# Patient Record
Sex: FEMALE | Race: ASIAN | Employment: UNEMPLOYED | URBAN - METROPOLITAN AREA
[De-identification: names, ages, dates, MRNs, and addresses within clinical notes are randomized per-mention and may not be internally consistent; named-entity substitution may affect disease eponyms.]

---

## 2017-05-09 ENCOUNTER — TRANSFERRED RECORDS (OUTPATIENT)
Dept: HEALTH INFORMATION MANAGEMENT | Facility: CLINIC | Age: 52
End: 2017-05-09

## 2017-10-15 ENCOUNTER — HEALTH MAINTENANCE LETTER (OUTPATIENT)
Age: 52
End: 2017-10-15

## 2018-11-15 ENCOUNTER — TRANSFERRED RECORDS (OUTPATIENT)
Dept: HEALTH INFORMATION MANAGEMENT | Facility: CLINIC | Age: 53
End: 2018-11-15

## 2019-02-14 ENCOUNTER — DOCUMENTATION ONLY (OUTPATIENT)
Dept: CARE COORDINATION | Facility: CLINIC | Age: 54
End: 2019-02-14

## 2019-02-26 DIAGNOSIS — R06.02 SOB (SHORTNESS OF BREATH): Primary | ICD-10-CM

## 2019-02-26 NOTE — TELEPHONE ENCOUNTER
RECORDS RECEIVED FROM: External   DATE RECEIVED: 3.7.19   NOTES STATUS DETAILS   OFFICE NOTE from referring provider Care Everywhere 12.26.18   11.15.18  8.22.18  5.24.18   OFFICE NOTE from other specialist Care Everywhere 3.9.18  2.26.18  8.14.17  5.22.17   DISCHARGE SUMMARY from hospital N/A    DISCHARGE REPORT from the ER N/A    OPERATIVE REPORT N/A    MEDICATION LIST Internal    IMAGING  (NEED IMAGES AND REPORTS)     CT SCAN In process 8.24.18   CHEST XRAY (CXR) In process 5.24.18 2.26.18  9.28.16  1.24.16   TESTS     PULMONARY FUNCTION TESTING (PFT) In process Requested results from 11.15.18   FLOW LOOP VOLUME (FVL) In process See above   CYSTIC FIBROSIS     CF SPUTUM CULTURE N/A       SOB, referred by Dr Vaibhav Fernandez. PFT done at   Action    Action Taken Requested PFT results and for images to be pushed into PACS from [facility]. Requested on 2.26.19.   Sent message to RN pool to place pft and cxr orders.  3.5.19 Sent 2nd request for images and pft results.  3.6.19 Spoke with Alyson at  and she will be pushing images. ALEJANDRA with [facility] recs regarding PFT request.  3.6.19 Pulled images from .  3.7.19 PFT results from  in provider's folder.

## 2019-03-07 ENCOUNTER — OFFICE VISIT (OUTPATIENT)
Dept: PULMONOLOGY | Facility: CLINIC | Age: 54
End: 2019-03-07
Payer: COMMERCIAL

## 2019-03-07 ENCOUNTER — ANCILLARY PROCEDURE (OUTPATIENT)
Dept: GENERAL RADIOLOGY | Facility: CLINIC | Age: 54
End: 2019-03-07
Payer: COMMERCIAL

## 2019-03-07 ENCOUNTER — PRE VISIT (OUTPATIENT)
Dept: PULMONOLOGY | Facility: CLINIC | Age: 54
End: 2019-03-07

## 2019-03-07 VITALS
HEIGHT: 58 IN | BODY MASS INDEX: 31.49 KG/M2 | WEIGHT: 150 LBS | OXYGEN SATURATION: 98 % | HEART RATE: 52 BPM | DIASTOLIC BLOOD PRESSURE: 79 MMHG | SYSTOLIC BLOOD PRESSURE: 152 MMHG | RESPIRATION RATE: 16 BRPM

## 2019-03-07 DIAGNOSIS — R06.02 SOB (SHORTNESS OF BREATH): ICD-10-CM

## 2019-03-07 DIAGNOSIS — R06.02 SHORTNESS OF BREATH: Primary | ICD-10-CM

## 2019-03-07 DIAGNOSIS — J47.9 BRONCHIECTASIS WITHOUT COMPLICATION (H): ICD-10-CM

## 2019-03-07 DIAGNOSIS — I27.20 PULMONARY HYPERTENSION (H): ICD-10-CM

## 2019-03-07 DIAGNOSIS — I25.10 CORONARY ARTERY DISEASE INVOLVING NATIVE HEART, ANGINA PRESENCE UNSPECIFIED, UNSPECIFIED VESSEL OR LESION TYPE: ICD-10-CM

## 2019-03-07 DIAGNOSIS — I42.1 HOCM (HYPERTROPHIC OBSTRUCTIVE CARDIOMYOPATHY) (H): ICD-10-CM

## 2019-03-07 DIAGNOSIS — J39.8 TRACHEOBRONCHOMALACIA: ICD-10-CM

## 2019-03-07 LAB
ALBUMIN SERPL-MCNC: 3.6 G/DL (ref 3.4–5)
ALP SERPL-CCNC: 87 U/L (ref 40–150)
ALT SERPL W P-5'-P-CCNC: 29 U/L (ref 0–50)
ANION GAP SERPL CALCULATED.3IONS-SCNC: 5 MMOL/L (ref 3–14)
AST SERPL W P-5'-P-CCNC: 25 U/L (ref 0–45)
BASOPHILS # BLD AUTO: 0.1 10E9/L (ref 0–0.2)
BASOPHILS NFR BLD AUTO: 0.6 %
BILIRUB SERPL-MCNC: 0.5 MG/DL (ref 0.2–1.3)
BUN SERPL-MCNC: 16 MG/DL (ref 7–30)
CALCIUM SERPL-MCNC: 8.7 MG/DL (ref 8.5–10.1)
CHLORIDE SERPL-SCNC: 103 MMOL/L (ref 94–109)
CO2 SERPL-SCNC: 31 MMOL/L (ref 20–32)
CREAT SERPL-MCNC: 0.62 MG/DL (ref 0.52–1.04)
CRP SERPL-MCNC: 3.7 MG/L (ref 0–8)
DIFFERENTIAL METHOD BLD: NORMAL
EOSINOPHIL # BLD AUTO: 0.3 10E9/L (ref 0–0.7)
EOSINOPHIL NFR BLD AUTO: 4.3 %
ERYTHROCYTE [DISTWIDTH] IN BLOOD BY AUTOMATED COUNT: 14.1 % (ref 10–15)
ERYTHROCYTE [SEDIMENTATION RATE] IN BLOOD BY WESTERGREN METHOD: 16 MM/H (ref 0–30)
GFR SERPL CREATININE-BSD FRML MDRD: >90 ML/MIN/{1.73_M2}
GLUCOSE SERPL-MCNC: 111 MG/DL (ref 70–99)
HCT VFR BLD AUTO: 42.5 % (ref 35–47)
HGB BLD-MCNC: 13.7 G/DL (ref 11.7–15.7)
IMM GRANULOCYTES # BLD: 0 10E9/L (ref 0–0.4)
IMM GRANULOCYTES NFR BLD: 0.3 %
LYMPHOCYTES # BLD AUTO: 2.8 10E9/L (ref 0.8–5.3)
LYMPHOCYTES NFR BLD AUTO: 35.3 %
MCH RBC QN AUTO: 28.9 PG (ref 26.5–33)
MCHC RBC AUTO-ENTMCNC: 32.2 G/DL (ref 31.5–36.5)
MCV RBC AUTO: 90 FL (ref 78–100)
MONOCYTES # BLD AUTO: 0.6 10E9/L (ref 0–1.3)
MONOCYTES NFR BLD AUTO: 7.4 %
NEUTROPHILS # BLD AUTO: 4.1 10E9/L (ref 1.6–8.3)
NEUTROPHILS NFR BLD AUTO: 52.1 %
NRBC # BLD AUTO: 0 10*3/UL
NRBC BLD AUTO-RTO: 0 /100
PLATELET # BLD AUTO: 202 10E9/L (ref 150–450)
POTASSIUM SERPL-SCNC: 3.9 MMOL/L (ref 3.4–5.3)
PROT SERPL-MCNC: 7.5 G/DL (ref 6.8–8.8)
RBC # BLD AUTO: 4.74 10E12/L (ref 3.8–5.2)
SODIUM SERPL-SCNC: 138 MMOL/L (ref 133–144)
WBC # BLD AUTO: 7.9 10E9/L (ref 4–11)

## 2019-03-07 PROCEDURE — 86431 RHEUMATOID FACTOR QUANT: CPT

## 2019-03-07 PROCEDURE — 82784 ASSAY IGA/IGD/IGG/IGM EACH: CPT

## 2019-03-07 PROCEDURE — 86038 ANTINUCLEAR ANTIBODIES: CPT

## 2019-03-07 PROCEDURE — 36415 COLL VENOUS BLD VENIPUNCTURE: CPT

## 2019-03-07 PROCEDURE — 86255 FLUORESCENT ANTIBODY SCREEN: CPT

## 2019-03-07 PROCEDURE — 82787 IGG 1 2 3 OR 4 EACH: CPT

## 2019-03-07 PROCEDURE — 85025 COMPLETE CBC W/AUTO DIFF WBC: CPT

## 2019-03-07 PROCEDURE — 86235 NUCLEAR ANTIGEN ANTIBODY: CPT

## 2019-03-07 PROCEDURE — 86140 C-REACTIVE PROTEIN: CPT

## 2019-03-07 PROCEDURE — 80053 COMPREHEN METABOLIC PANEL: CPT

## 2019-03-07 PROCEDURE — 86200 CCP ANTIBODY: CPT

## 2019-03-07 PROCEDURE — 85652 RBC SED RATE AUTOMATED: CPT

## 2019-03-07 PROCEDURE — G0463 HOSPITAL OUTPT CLINIC VISIT: HCPCS | Mod: ZF

## 2019-03-07 RX ORDER — IPRATROPIUM BROMIDE AND ALBUTEROL SULFATE 2.5; .5 MG/3ML; MG/3ML
1 SOLUTION RESPIRATORY (INHALATION) EVERY 6 HOURS PRN
Qty: 360 ML | Refills: 3 | Status: SHIPPED | OUTPATIENT
Start: 2019-03-07 | End: 2019-07-01

## 2019-03-07 ASSESSMENT — PAIN SCALES - GENERAL: PAINLEVEL: NO PAIN (0)

## 2019-03-07 ASSESSMENT — MIFFLIN-ST. JEOR: SCORE: 1160.02

## 2019-03-07 NOTE — LETTER
3/7/2019       RE: Summer Freeman  [id number]  [facility] [place] [id number]     Dear Colleague,    Thank you for referring your patient, Summer Freeman, to the [facility] LUNG SCIENCE AND HEALTH at [facility]. Please see a copy of my visit note below.    [facility]  Pulmonary Medicine  Visit Clinic Note  March 7, 2019         ASSESSMENT & PLAN       Shortness of Breath: This is a bit difficult to sort out since she has both cardiac and pulmonary disease.      From a cardiac standpoint, she has hypertrophic cardiomyopathy.  She has signficantly elevated LVEDP on her last heart cath. Her pulmonary artery on chest CT appears dilated, and I suspect this is related to chronic left heart disease.  She has significant CAD and has received stents in the past and has wall motion abnormalities on exercise ECHO. She is currently hypertensive in the clinic.     From a pulmonary perspective, she has bronchiectasis, what appears to be significant tracheobronchomalacia with excessive airway dynamic collapse, and mosaic attenuation defects of the parenchyma which support airways disease. Her PFTs are non-specific, but could support airways disease with a low FVC, and normal TLC and RV. I believe her main pulmonary issue is the dynamic collapse of her airways.  This could her imaging findings of mosaic attenuation defects.  It could potentially lead to bronchiectasis if she has a tough time clearing secretions in her lower lobes.  An alternative diagnosis would be obliterative bronchiolitis, which comes with a more ominous prognosis and is much more rare.      It is tough to know how much of her dyspnea is related to the heart vs the lungs.  I discussed the utility of a CPET.  More than likely, it will not be able to distinguish between the two, but there are not other good options.  If her breathing reserve is exhausted that may suggest respiratory.     I recommended  "that she attend pulmonary rehab, get used to using pursed lip breathing, lose weight and get her body in better condition.  She should continue to use CPAP.  I have prescribe her a nebulizer with duo-nebs.  She has not had much benefit with breo.  Perhaps an nebulized bronchodilator will be able to reach her distal airways more effectively.  I have also asked her to get blood work looking for causes of her bronchiectasis (MARIA ALEJANDRA, RF, CCP, ANCA, Ig levels, Scl-70).      I will call her back after I receive her testing results. Unfortunately, obliterative bronchiolitis does not have good treatment options.  An open lung biopsy is required to diagnose this, and I do not think that is necessary at this time.  She does need to have her spirometry measured at least every 6 months to look for changes.  obliterative bronchiolitis is typically described as having a low FEV1/FVC, but it doesn't always present that way. Lung transplant is the only \"fix\" for this diagnosis. Tracheobronchomalacia also doesn't have good treatment options, but at least has a better prognosis.  Weight loss will help, as does positive airway pressure.  We could work on getting her a NIOV device.  I would like to see that she participates in pulmonary rehab.        Randy Watt MD          Today's visit note:     Chief Complaint: Summer Freeman is a 54 year old year old female who is being seen for Consult (Shortness of Breathe )      HISTORY OF PRESENT ILLNESS:    This is a 54-year-old female with a history of hypertrophic cardiomyopathy, coronary artery disease, bronchiectasis who is presenting to the pulmonary clinic today as a second opinion for shortness of breath.    She states she was last felt normal from a shortness of breath perspective when she is around 38 years old.  She has had gradually progressive shortness of breath since then.  A couple years ago she began to get more medical workup for this.  She was diagnosed with a myocardial " "infarction, had stents placed, had heart catheterizations showing an elevated left ventricular end-diastolic pressure, pulmonary hypertension, and left ventricular outflow tract obstruction consistent with a hypertrophic cardiomyopathy.  It was initially felt that all of her shortness of breath was related to the heart due to these reasons.  However, pulmonary function testing also showed mild restriction and she had bronchiectasis on her chest CT scan.  Therefore pulmonary diagnoses were also considered as a cause for her shortness of breath.    She has been put on a trial of Breo, which is an inhaled corticosteroid and long-acting beta agonist.  She has not noted any benefit in her breathing.  She is put on systemic prednisone for a couple weeks, and also did not notice any improvement in her breathing.  She was seen by a sleep doctor, who diagnosed her with obstructive sleep apnea.  She is wearing her CPAP machine, and actually notices that she gets much better sleep since she has been using this.    Overall, her dyspnea is pretty severe.  She think she could probably want to walk 1 block, but it would be hard to do.  She is short of breath walking around her house doing normal activities.  She describes chest tightness, but no cough and no wheeze.  She does not produce any sputum.  She does notice a little bit of mucus in the back of her throat when she wakes up in the morning.    There are no new joint pains or skin rashes.  She does have chronic neck and back pain.           Past Medical and Surgical History:     Past Medical History:   Diagnosis Date     Chronic pain      Coronary artery disease      Gastro-oesophageal reflux disease      Heart attack (H)      History of angina     \"has chest pain all the time\"--takes medications for it and nitroglycerin aprox 1-2 times weekly     Sleep apnea      Stented coronary artery     Cardiac Stents X 3 last in 2009     Past Surgical History:   Procedure Laterality " Date     ANGIOPLASTY       FUSION CERVICAL ANTERIOR ONE LEVEL       OPTICAL TRACKING SYSTEM FUSION POSTERIOR SPINE LUMBAR  8/8/2013    Procedure: OPTICAL TRACKING SYSTEM FUSION SPINE POSTERIOR LUMBAR ONE LEVEL;  Lumbar 4-5 Transforaminal Lumbar Interbody Fusion   ;  Surgeon: Harrison Wolfe MD;  Location: [facility] OR           Family History:     She is not aware of any medical problems in the family.            Social History:     Social History     Socioeconomic History     Marital status:      Spouse name: Not on file     Number of children: Not on file     Years of education: Not on file     Highest education level: Not on file   Occupational History     Not on file   Social Needs     Financial resource strain: Not on file     Food insecurity:     Worry: Not on file     Inability: Not on file     Transportation needs:     Medical: Not on file     Non-medical: Not on file   Tobacco Use     Smoking status: Never Smoker     Smokeless tobacco: Never Used   Substance and Sexual Activity     Alcohol use: No     Drug use: No     Sexual activity: Not on file   Lifestyle     Physical activity:     Days per week: Not on file     Minutes per session: Not on file     Stress: Not on file   Relationships     Social connections:     Talks on phone: Not on file     Gets together: Not on file     Attends Hindu service: Not on file     Active member of club or organization: Not on file     Attends meetings of clubs or organizations: Not on file     Relationship status: Not on file     Intimate partner violence:     Fear of current or ex partner: Not on file     Emotionally abused: Not on file     Physically abused: Not on file     Forced sexual activity: Not on file   Other Topics Concern     Not on file   Social History Narrative    . Worked for [employer].     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              Medications:     Current Outpatient Medications  "  Medication     aspirin 325 MG tablet     baclofen (LIORESAL) 10 MG tablet     clonazePAM (KLONOPIN) 0.5 MG tablet     clopidogrel (PLAVIX) 75 MG tablet     divalproex (DEPAKOTE) 250 MG EC tablet     famotidine (PEPCID) 20 MG tablet     Furosemide (LASIX) 20 MG tablet     imipramine (TOFRANIL) 25 MG tablet     metoprolol (TOPROL-XL) 100 MG 24 hr tablet     nitroglycerin (NITROSTAT) 0.4 MG SL tablet     pantoprazole (PROTONIX) 40 MG enteric coated tablet     ranolazine (RANEXA) 500 MG 12 hr tablet     rosuvastatin (CRESTOR) 20 MG tablet     venlafaxine (EFFEXOR XR) 75 MG 24 hr capsule     zolpidem (AMBIEN) 10 MG tablet     No current facility-administered medications for this visit.             Review of Systems:       A complete review of systems was otherwise negative except as noted in the HPI.      PHYSICAL EXAM:  /79 (BP Location: Right arm, Patient Position: Chair, Cuff Size: Adult Regular)   Pulse 52   Resp 16   Ht 1.473 m (4' 9.99\")   Wt 68 kg (150 lb)   SpO2 98%   BMI 31.36 kg/m        General: Pleasant, no apparent distress  Eyes: Anicteric  Nose: Nasal mucosa with no edema or hyperemia.  No polyps  Ears: Hearing grossly normal  Mouth: Mallampati 4  Neck: supple, no thyromegaly  Lymphatics: No cervical or supraclavicular nodes  Respiratory: Good air movement.  Dry crackles in the right lung base. No rhonchi. No wheezes  Cardiac: Bradycardic, normal S1, S2. No murmurs. No JVD  Abdomen: Soft, NT/ND  Musculoskeletal: Extremities normal. No clubbing. No cyanosis. No edema.  Skin: No rash on limited exam  Neuro: Normal mentation. Normal speech.  Psych:Normal affect           Data:   All laboratory and imaging data reviewed.      PFT:   May 9, 2017  FVC is 2.06 L which is 75% predicted.  FEV1 is 1.58 L which is 73% predicted.  FEV1/FVC ratio is 0.77.  These are all prebronchodilator values.  Lung volumes show a total lung capacity of 94% predicted, and a residual volume of 105% predicted.  Diffusion " capacity is 69% predicted.  It corrects to normal when normalized alveolar volume.    November 15, 2018  FVC is 1.93 L which is 71% predicted and improves to 2.1 L which is 77% predicted after administration of albuterol.  FEV1 is 1.46 L which is 69% predicted and improved to 1.6 L which is 75% predicted after administration of albuterol.  Postbronchodilator FEV1/FVC ratio 0.76.  Total lung capacity is 89% predicted and the residual volume is 105% predicted.  The RV\TLC ratio was 0.41.  Diffusion capacity is 83% predicted    PFT Interpretation:  No airflow obstruction by FEV1/FVC ratio. Low spirometry and elevated RV suggest some airways disease though.  Normal total lung capacity.    Valid Maneuver    CXR: I have reviewed the CXR images.  Right hear looks enlarged.  Normal appearing lung fields.  Awaiting radiology report.      Chest CT: I have reviewed the chest CT from 2013 as well as August 2018.  The 2013 chest CT scan of the PE protocol CT.  On my review, it showed areas of bronchitis, and bronchiectasis.  The bronchiectasis was especially notable in the left lower lobe.  There is some consolidative changes in the right lower lobe with associated small pleural effusion.  There is diffuse excessive dynamic airway collapse in the trachea and the major bronchi.  The right bronchi had areas of almost near occlusion.    On my review, the August 2018 chest CT scan shows worsening bilateral lower lobe bronchiectasis.  There is no significant mucus in the airways.  On expiratory imaging, there is excessive airway dynamic collapse.  There is also mosaic attenuation defects that is likely related to gas trapping.  Her heart appears enlarged, with a small pericardial effusion.  Stents are noted.  Left atrium is enlarged.  Pulmonary artery is enlarged.    Exercise ECHO 7/2018:     CONCLUSION:    Patient exercised 6 minutes 57 seconds on a Darian protocol.     Normal functional aerobic capacity.     Patient developed 8/10  chest tightness and dizziness at peak     exercise, resolved in recovery.    Borderline ECG test for diagnostic ST depression.     Stress-induced hypokinesis of the apex and antwon-apical segments     is noted. This would suggest LAD disease.    LVH is noted.    [facility] 7/2018:  7/12/2018 coronary angiography performed demonstrated     1.  Nonobstructive CAD.  Moderate lesions in the LAD,         LCx and RCA were all negative by careful assessment   with FFR.  Angina secondary to hypertrophic cardiomyopathy.        2.  LVEDP moderately elevated 25mmHg.         3.  Hypertension.   10% stenosis in left main coronary artery.  Drug-eluting stent in proximal LAD.  Drug-eluting stent in diagonal 1.  50% stenosis in stent in mid LAD.  30% stenosis in distal LAD.  Drug-eluting stent in proximal circumflex.  50% stenosis in obtuse augustine 1.  50% stenosis in proximal RCA.  20% stenosis in distal RCA.  80% stenosis in right posterolateral 1.     Ricardo Watt MD

## 2019-03-07 NOTE — PROGRESS NOTES
[facility]  Pulmonary Medicine  Visit Clinic Note  March 7, 2019         ASSESSMENT & PLAN       Shortness of Breath: This is a bit difficult to sort out since she has both cardiac and pulmonary disease.      From a cardiac standpoint, she has hypertrophic cardiomyopathy.  She has signficantly elevated LVEDP on her last heart cath. Her pulmonary artery on chest CT appears dilated, and I suspect this is related to chronic left heart disease.  She has significant CAD and has received stents in the past and has wall motion abnormalities on exercise ECHO. She is currently hypertensive in the clinic.     From a pulmonary perspective, she has bronchiectasis, what appears to be significant tracheobronchomalacia with excessive airway dynamic collapse, and mosaic attenuation defects of the parenchyma which support airways disease. Her PFTs are non-specific, but could support airways disease with a low FVC, and normal TLC and RV. I believe her main pulmonary issue is the dynamic collapse of her airways.  This could her imaging findings of mosaic attenuation defects.  It could potentially lead to bronchiectasis if she has a tough time clearing secretions in her lower lobes.  An alternative diagnosis would be obliterative bronchiolitis, which comes with a more ominous prognosis and is much more rare.      It is tough to know how much of her dyspnea is related to the heart vs the lungs.  I discussed the utility of a CPET.  More than likely, it will not be able to distinguish between the two, but there are not other good options.  If her breathing reserve is exhausted that may suggest respiratory.     I recommended that she attend pulmonary rehab, get used to using pursed lip breathing, lose weight and get her body in better condition.  She should continue to use CPAP.  I have prescribe her a nebulizer with duo-nebs.  She has not had much benefit with breo.  Perhaps an nebulized bronchodilator will be able to  "reach her distal airways more effectively.  I have also asked her to get blood work looking for causes of her bronchiectasis (MARIA ALEJANDRA, RF, CCP, ANCA, Ig levels, Scl-70).      I will call her back after I receive her testing results. Unfortunately, obliterative bronchiolitis does not have good treatment options.  An open lung biopsy is required to diagnose this, and I do not think that is necessary at this time.  She does need to have her spirometry measured at least every 6 months to look for changes.  obliterative bronchiolitis is typically described as having a low FEV1/FVC, but it doesn't always present that way. Lung transplant is the only \"fix\" for this diagnosis. Tracheobronchomalacia also doesn't have good treatment options, but at least has a better prognosis.  Weight loss will help, as does positive airway pressure.  We could work on getting her a NIOV device.  I would like to see that she participates in pulmonary rehab.        Randy Watt MD          Today's visit note:     Chief Complaint: Summer Freeman is a 54 year old year old female who is being seen for Consult (Shortness of Breathe )      HISTORY OF PRESENT ILLNESS:    This is a 54-year-old female with a history of hypertrophic cardiomyopathy, coronary artery disease, bronchiectasis who is presenting to the pulmonary clinic today as a second opinion for shortness of breath.    She states she was last felt normal from a shortness of breath perspective when she is around 38 years old.  She has had gradually progressive shortness of breath since then.  A couple years ago she began to get more medical workup for this.  She was diagnosed with a myocardial infarction, had stents placed, had heart catheterizations showing an elevated left ventricular end-diastolic pressure, pulmonary hypertension, and left ventricular outflow tract obstruction consistent with a hypertrophic cardiomyopathy.  It was initially felt that all of her shortness of breath was related " "to the heart due to these reasons.  However, pulmonary function testing also showed mild restriction and she had bronchiectasis on her chest CT scan.  Therefore pulmonary diagnoses were also considered as a cause for her shortness of breath.    She has been put on a trial of Breo, which is an inhaled corticosteroid and long-acting beta agonist.  She has not noted any benefit in her breathing.  She is put on systemic prednisone for a couple weeks, and also did not notice any improvement in her breathing.  She was seen by a sleep doctor, who diagnosed her with obstructive sleep apnea.  She is wearing her CPAP machine, and actually notices that she gets much better sleep since she has been using this.    Overall, her dyspnea is pretty severe.  She think she could probably want to walk 1 block, but it would be hard to do.  She is short of breath walking around her house doing normal activities.  She describes chest tightness, but no cough and no wheeze.  She does not produce any sputum.  She does notice a little bit of mucus in the back of her throat when she wakes up in the morning.    There are no new joint pains or skin rashes.  She does have chronic neck and back pain.           Past Medical and Surgical History:     Past Medical History:   Diagnosis Date     Chronic pain      Coronary artery disease      Gastro-oesophageal reflux disease      Heart attack (H)      History of angina     \"has chest pain all the time\"--takes medications for it and nitroglycerin aprox 1-2 times weekly     Sleep apnea      Stented coronary artery     Cardiac Stents X 3 last in 2009     Past Surgical History:   Procedure Laterality Date     ANGIOPLASTY       FUSION CERVICAL ANTERIOR ONE LEVEL       OPTICAL TRACKING SYSTEM FUSION POSTERIOR SPINE LUMBAR  8/8/2013    Procedure: OPTICAL TRACKING SYSTEM FUSION SPINE POSTERIOR LUMBAR ONE LEVEL;  Lumbar 4-5 Transforaminal Lumbar Interbody Fusion   ;  Surgeon: Harrison Wolfe MD;  " Location: [facility] OR           Family History:     She is not aware of any medical problems in the family.            Social History:     Social History     Socioeconomic History     Marital status:      Spouse name: Not on file     Number of children: Not on file     Years of education: Not on file     Highest education level: Not on file   Occupational History     Not on file   Social Needs     Financial resource strain: Not on file     Food insecurity:     Worry: Not on file     Inability: Not on file     Transportation needs:     Medical: Not on file     Non-medical: Not on file   Tobacco Use     Smoking status: Never Smoker     Smokeless tobacco: Never Used   Substance and Sexual Activity     Alcohol use: No     Drug use: No     Sexual activity: Not on file   Lifestyle     Physical activity:     Days per week: Not on file     Minutes per session: Not on file     Stress: Not on file   Relationships     Social connections:     Talks on phone: Not on file     Gets together: Not on file     Attends Buddhism service: Not on file     Active member of club or organization: Not on file     Attends meetings of clubs or organizations: Not on file     Relationship status: Not on file     Intimate partner violence:     Fear of current or ex partner: Not on file     Emotionally abused: Not on file     Physically abused: Not on file     Forced sexual activity: Not on file   Other Topics Concern     Not on file   Social History Narrative    . Worked for [employer].     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              Medications:     Current Outpatient Medications   Medication     aspirin 325 MG tablet     baclofen (LIORESAL) 10 MG tablet     clonazePAM (KLONOPIN) 0.5 MG tablet     clopidogrel (PLAVIX) 75 MG tablet     divalproex (DEPAKOTE) 250 MG EC tablet     famotidine (PEPCID) 20 MG tablet     Furosemide (LASIX) 20 MG tablet     imipramine (TOFRANIL) 25 MG tablet      "metoprolol (TOPROL-XL) 100 MG 24 hr tablet     nitroglycerin (NITROSTAT) 0.4 MG SL tablet     pantoprazole (PROTONIX) 40 MG enteric coated tablet     ranolazine (RANEXA) 500 MG 12 hr tablet     rosuvastatin (CRESTOR) 20 MG tablet     venlafaxine (EFFEXOR XR) 75 MG 24 hr capsule     zolpidem (AMBIEN) 10 MG tablet     No current facility-administered medications for this visit.             Review of Systems:       A complete review of systems was otherwise negative except as noted in the HPI.      PHYSICAL EXAM:  /79 (BP Location: Right arm, Patient Position: Chair, Cuff Size: Adult Regular)   Pulse 52   Resp 16   Ht 1.473 m (4' 9.99\")   Wt 68 kg (150 lb)   SpO2 98%   BMI 31.36 kg/m       General: Pleasant, no apparent distress  Eyes: Anicteric  Nose: Nasal mucosa with no edema or hyperemia.  No polyps  Ears: Hearing grossly normal  Mouth: Mallampati 4  Neck: supple, no thyromegaly  Lymphatics: No cervical or supraclavicular nodes  Respiratory: Good air movement.  Dry crackles in the right lung base. No rhonchi. No wheezes  Cardiac: Bradycardic, normal S1, S2. No murmurs. No JVD  Abdomen: Soft, NT/ND  Musculoskeletal: Extremities normal. No clubbing. No cyanosis. No edema.  Skin: No rash on limited exam  Neuro: Normal mentation. Normal speech.  Psych:Normal affect           Data:   All laboratory and imaging data reviewed.      PFT:   May 9, 2017  FVC is 2.06 L which is 75% predicted.  FEV1 is 1.58 L which is 73% predicted.  FEV1/FVC ratio is 0.77.  These are all prebronchodilator values.  Lung volumes show a total lung capacity of 94% predicted, and a residual volume of 105% predicted.  Diffusion capacity is 69% predicted.  It corrects to normal when normalized alveolar volume.    November 15, 2018  FVC is 1.93 L which is 71% predicted and improves to 2.1 L which is 77% predicted after administration of albuterol.  FEV1 is 1.46 L which is 69% predicted and improved to 1.6 L which is 75% predicted after " administration of albuterol.  Postbronchodilator FEV1/FVC ratio 0.76.  Total lung capacity is 89% predicted and the residual volume is 105% predicted.  The RV\TLC ratio was 0.41.  Diffusion capacity is 83% predicted    PFT Interpretation:  No airflow obstruction by FEV1/FVC ratio. Low spirometry and elevated RV suggest some airways disease though.  Normal total lung capacity.    Valid Maneuver    CXR: I have reviewed the CXR images.  Right hear looks enlarged.  Normal appearing lung fields.  Awaiting radiology report.      Chest CT: I have reviewed the chest CT from 2013 as well as August 2018.  The 2013 chest CT scan of the PE protocol CT.  On my review, it showed areas of bronchitis, and bronchiectasis.  The bronchiectasis was especially notable in the left lower lobe.  There is some consolidative changes in the right lower lobe with associated small pleural effusion.  There is diffuse excessive dynamic airway collapse in the trachea and the major bronchi.  The right bronchi had areas of almost near occlusion.    On my review, the August 2018 chest CT scan shows worsening bilateral lower lobe bronchiectasis.  There is no significant mucus in the airways.  On expiratory imaging, there is excessive airway dynamic collapse.  There is also mosaic attenuation defects that is likely related to gas trapping.  Her heart appears enlarged, with a small pericardial effusion.  Stents are noted.  Left atrium is enlarged.  Pulmonary artery is enlarged.    Exercise ECHO 7/2018:     CONCLUSION:    Patient exercised 6 minutes 57 seconds on a Darian protocol.     Normal functional aerobic capacity.     Patient developed 8/10 chest tightness and dizziness at peak     exercise, resolved in recovery.    Borderline ECG test for diagnostic ST depression.     Stress-induced hypokinesis of the apex and antwon-apical segments     is noted. This would suggest LAD disease.    LVH is noted.    [facility] 7/2018:  7/12/2018 coronary angiography  performed demonstrated     1.  Nonobstructive CAD.  Moderate lesions in the LAD,         LCx and RCA were all negative by careful assessment   with FFR.  Angina secondary to hypertrophic cardiomyopathy.        2.  LVEDP moderately elevated 25mmHg.         3.  Hypertension.   10% stenosis in left main coronary artery.  Drug-eluting stent in proximal LAD.  Drug-eluting stent in diagonal 1.  50% stenosis in stent in mid LAD.  30% stenosis in distal LAD.  Drug-eluting stent in proximal circumflex.  50% stenosis in obtuse augustine 1.  50% stenosis in proximal RCA.  20% stenosis in distal RCA.  80% stenosis in right posterolateral 1.

## 2019-03-07 NOTE — NURSING NOTE
Chief Complaint   Patient presents with     Consult     Shortness of Breathe    Pt has not had mammogram done yet, contact information has been added to  AVS   pt did not have colonoscopy done yet, contact information has been added to AVS.    Gabriela Chan, [facility]

## 2019-03-07 NOTE — PATIENT INSTRUCTIONS
Preventive Care:     Colorectal Cancer Screening: During our visit today, we discussed that it is recommended you receive colorectal cancer screening. Please call or make an appointment with your primary care provider to discuss this. You may also call the [facility] scheduling line ([phone]) to set up a colonoscopy appointment.  Preventive Care:     Breast Cancer Screening: During our visit today, we discussed that it is recommended you receive breast cancer screening. Please call or make an appointment with your primary care provider to discuss this with them. You may also call the  [email or web address] scheduling line ([phone]) to set up a mammography appointment at the [facility] within the [facility].

## 2019-03-08 LAB
ANA SER QL IF: NEGATIVE
ANCA AB PATTERN SER IF-IMP: NORMAL
C-ANCA TITR SER IF: NORMAL {TITER}
CCP AB SER IA-ACNC: 1 U/ML
ENA JO1 IGG SER-ACNC: <0.2 AI (ref 0–0.9)
ENA SCL70 IGG SER IA-ACNC: <0.2 AI (ref 0–0.9)
IGG SERPL-MCNC: 1290 MG/DL (ref 695–1620)
IGG1 SER-MCNC: 723 MG/DL (ref 300–856)
IGG2 SER-MCNC: 271 MG/DL (ref 158–761)
IGG3 SER-MCNC: 76 MG/DL (ref 24–192)
IGG4 SER-MCNC: 19 MG/DL (ref 11–86)
RHEUMATOID FACT SER NEPH-ACNC: <20 IU/ML (ref 0–20)

## 2019-03-15 ENCOUNTER — HOSPITAL ENCOUNTER (OUTPATIENT)
Dept: CARDIOLOGY | Facility: CLINIC | Age: 54
Discharge: HOME | End: 2019-03-15
Payer: COMMERCIAL

## 2019-03-15 DIAGNOSIS — R06.02 SHORTNESS OF BREATH: ICD-10-CM

## 2019-03-15 PROCEDURE — 94621 CARDIOPULM EXERCISE TESTING: CPT

## 2019-03-15 PROCEDURE — 94621 CARDIOPULM EXERCISE TESTING: CPT | Mod: 26 | Performed by: INTERNAL MEDICINE

## 2019-04-05 ENCOUNTER — TELEPHONE (OUTPATIENT)
Dept: PULMONOLOGY | Facility: CLINIC | Age: 54
End: 2019-04-05

## 2019-04-05 NOTE — TELEPHONE ENCOUNTER
I called Ms Freeman to follow up with regarding her blood work and exercise testing.      Blood work did not reveal any inflammatory cause for her bronchiectasis.      Her cardiopulmonary exercise test was done on a treadmill.  The test was stopped early because she was dizzy during the test and her blood pressure actually decreased which is abnormal.      Notably, her breathing reserve was not exhausted, suggesting that the lungs were not the cause of the early exercise termination.  Physiologic data such as her O2 pulse, early anaerobic threshold, and HR and blood pressure response suggest a cardiac etiology for her early exercise termination.  I also suspect that deconditioning is playing a big role for her.      I told her that her lungs may causing some symptoms, but that she should focus on working with her cardiologist to optimize cardiac function.  She also needs to lose weight and starting exercising.     She should continue to get yearly PFTs to follow any potential change in her restrictive physiology.      Randy Watt MD

## 2019-07-01 DIAGNOSIS — J47.9 BRONCHIECTASIS WITHOUT COMPLICATION (H): ICD-10-CM

## 2019-07-01 RX ORDER — IPRATROPIUM BROMIDE AND ALBUTEROL SULFATE 2.5; .5 MG/3ML; MG/3ML
1 SOLUTION RESPIRATORY (INHALATION) EVERY 6 HOURS PRN
Qty: 360 ML | Refills: 3 | Status: SHIPPED | OUTPATIENT
Start: 2019-07-01 | End: 2019-10-25

## 2019-10-25 DIAGNOSIS — J47.9 BRONCHIECTASIS WITHOUT COMPLICATION (H): ICD-10-CM

## 2019-10-25 RX ORDER — IPRATROPIUM BROMIDE AND ALBUTEROL SULFATE 2.5; .5 MG/3ML; MG/3ML
1 SOLUTION RESPIRATORY (INHALATION) EVERY 6 HOURS PRN
Qty: 360 ML | Refills: 3 | Status: SHIPPED | OUTPATIENT
Start: 2019-10-25

## 2020-02-24 ENCOUNTER — HEALTH MAINTENANCE LETTER (OUTPATIENT)
Age: 55
End: 2020-02-24

## 2020-12-13 ENCOUNTER — HEALTH MAINTENANCE LETTER (OUTPATIENT)
Age: 55
End: 2020-12-13

## 2021-04-17 ENCOUNTER — HEALTH MAINTENANCE LETTER (OUTPATIENT)
Age: 56
End: 2021-04-17

## 2021-05-29 ENCOUNTER — RECORDS - HEALTHEAST (OUTPATIENT)
Dept: ADMINISTRATIVE | Facility: CLINIC | Age: 56
End: 2021-05-29

## 2021-09-26 ENCOUNTER — HEALTH MAINTENANCE LETTER (OUTPATIENT)
Age: 56
End: 2021-09-26

## 2022-03-13 ENCOUNTER — HEALTH MAINTENANCE LETTER (OUTPATIENT)
Age: 57
End: 2022-03-13

## 2022-05-08 ENCOUNTER — HEALTH MAINTENANCE LETTER (OUTPATIENT)
Age: 57
End: 2022-05-08

## 2023-04-23 ENCOUNTER — HEALTH MAINTENANCE LETTER (OUTPATIENT)
Age: 58
End: 2023-04-23

## 2023-06-02 ENCOUNTER — HEALTH MAINTENANCE LETTER (OUTPATIENT)
Age: 58
End: 2023-06-02